# Patient Record
Sex: MALE | Race: WHITE | NOT HISPANIC OR LATINO | URBAN - METROPOLITAN AREA
[De-identification: names, ages, dates, MRNs, and addresses within clinical notes are randomized per-mention and may not be internally consistent; named-entity substitution may affect disease eponyms.]

---

## 2017-02-17 ENCOUNTER — OFFICE VISIT (OUTPATIENT)
Dept: DERMATOLOGY | Facility: CLINIC | Age: 72
End: 2017-02-17

## 2017-02-17 DIAGNOSIS — L82.0 INFLAMED SEBORRHEIC KERATOSIS: ICD-10-CM

## 2017-02-17 DIAGNOSIS — L21.9 SEBORRHEIC DERMATITIS: Primary | ICD-10-CM

## 2017-02-17 DIAGNOSIS — D18.01 CHERRY ANGIOMA: ICD-10-CM

## 2017-02-17 DIAGNOSIS — L57.0 AK (ACTINIC KERATOSIS): ICD-10-CM

## 2017-02-17 DIAGNOSIS — L91.8 ST (SKIN TAG): ICD-10-CM

## 2017-02-17 DIAGNOSIS — L82.1 SK (SEBORRHEIC KERATOSIS): ICD-10-CM

## 2017-02-17 DIAGNOSIS — Z85.828 HISTORY OF NONMELANOMA SKIN CANCER: ICD-10-CM

## 2017-02-17 RX ORDER — SILDENAFIL 25 MG/1
TABLET, FILM COATED ORAL
COMMUNITY
Start: 2009-05-27

## 2017-02-17 RX ORDER — SILDENAFIL 50 MG/1
50 TABLET, FILM COATED ORAL
COMMUNITY
Start: 2012-10-26

## 2017-02-17 RX ORDER — VENLAFAXINE HYDROCHLORIDE 150 MG/1
CAPSULE, EXTENDED RELEASE ORAL DAILY
COMMUNITY

## 2017-02-17 RX ORDER — SIMVASTATIN 20 MG
TABLET ORAL
COMMUNITY
Start: 2009-05-27

## 2017-02-17 RX ORDER — ALBUTEROL SULFATE 90 UG/1
1-2 AEROSOL, METERED RESPIRATORY (INHALATION)
COMMUNITY

## 2017-02-17 RX ORDER — AMPICILLIN TRIHYDRATE 250 MG
500 CAPSULE ORAL
COMMUNITY
Start: 2012-10-26

## 2017-02-17 RX ORDER — KETOCONAZOLE 20 MG/G
CREAM TOPICAL DAILY
Qty: 60 G | Refills: 5 | Status: SHIPPED | OUTPATIENT
Start: 2017-02-17

## 2017-02-17 RX ORDER — LISINOPRIL 40 MG/1
40 TABLET ORAL
COMMUNITY

## 2017-02-17 RX ORDER — NAPROXEN 500 MG/1
TABLET ORAL
COMMUNITY
Start: 2007-01-03

## 2017-02-17 ASSESSMENT — PAIN SCALES - GENERAL: PAINLEVEL: NO PAIN (0)

## 2017-02-17 NOTE — LETTER
"2/17/2017       RE: Mick Luevano  [address] [address] [facility] [address]     Dear Colleague,    Thank you for referring your patient, Mick Luevano, to the [facility] DERMATOLOGY at [facility]. Please see a copy of my visit note below.    [facility] Dermatology Note    Dermatology Problem List:  1. History of BCC, left nasal ala s/p Mohs 10/1/08  2. Inflamed SK s/p cryo  3. Seborrheic dermatitis  - ketoconazole 2% cream  4. AK s/p cryo     CC:   Chief Complaint   Patient presents with     Skin Check     last skin check \"over 3 years ago\"     Date of Service: Feb 17, 2017    History of Present Illness:  Mr. Mick Luevano is a 71 year old male who presents for a skin check. The patient was last seen on 9/12/13 by Dr. Stone when an irritated and inflamed SK was treated with cryotherapy. Today the patient reports a spot of concern on his left temple. He also reports some redness and scaly on the eyebrows, forehead, and side of his nose. He has used Elidel cream, vaseline, hydrocortisone cream, and other topical steroid medications. He reports that the spot on the left side of his trunk get caught on his clothing and are irritating. He notes that he has a \"breathing machine\", but he doesn't use it very often. The patient reports no other lesions of concern at this time.    Otherwise, the patient reports no painful, bleeding, nonhealing, or pruritic lesions, and denies new or changing moles.    Past Medical History:   Patient Active Problem List   Diagnosis     Personal history of other malignant neoplasm of skin     No past medical history on file.  No past surgical history on file.     Social History:  The patient is a retired . He wears hats and sunscreen.     Family History:  Not addressed at this visit.     Medications:  Current Outpatient Prescriptions   Medication Sig Dispense Refill     Omeprazole (PRILOSEC PO) Take  by mouth daily.   "     Allergies:  No Known Allergies     Review of Systems:  - Skin: As above in HPI. No additional skin concerns.    Physical exam:  Vitals: There were no vitals taken for this visit.  GEN: This is a well developed, well-nourished male in no acute distress, in a pleasant mood.      SKIN: Total skin excluding the undergarment areas was performed. The exam included the head/face, neck, both arms, chest, back, abdomen, both legs, digits and/or nails.   - Left temple: 1.8 x 1 cm stuck on tan to brown papule  - Stuck on tan to brown papules scattered on the trunk, extremities  - There is macular erythema of the eyebrows, forehead, and sides of nose with mild flaky white scale.  - There is(are) skin colored pedunculated papules on the left axilla.   - There is a bright red dome shaped papule on the left chest.  - Gritty pink papules on the vertex scalp x 2  - No other lesions of concern on areas examined.     Impression/Plan:  1. History of BCC, left nasal ala s/p Mohs 10/1/08  - No evidence or recurrence.    2. Seborrheic keratosis - left temple, trunk, extremities. The lesion on the trunk catches on clothing and are irritating.   - Benign nature was discussed. No further intervention required at this time.   - Cryotherapy procedure note: After verbal consent and discussion of risks and benefits including but no limited to dyspigmentation/scar, blister, and pain, 1 was(were) treated with 1-2mm freeze border for 2 cycles with liquid nitrogen. Post cryotherapy instructions were provided.     3. Seborrheic dermatitis  - Start ketoconazole 2% cream - apply daily to affected areas  - If this provides no improvement, contact clinic and can restart Elidel cream.    4. Acrochordon - left axilla  - Benign nature was discussed. No further intervention required at this time.     5. Cherry angioma - left chest  - Benign nature was discussed. No further intervention required at this time.     6. Actinic keratoses - vertex scalp x 2  -  Cryotherapy procedure note: After verbal consent and discussion of risks and benefits including but no limited to dyspigmentation/scar, blister, and pain, 2 was(were) treated with 1-2mm freeze border for 2 cycles with liquid nitrogen. Post cryotherapy instructions were provided.     Follow-up in 1 year, earlier for new or changing lesions.    Staff Involved:  Staff Only    Scribe Disclosure:   I, Magdalena Nicole, am serving as a scribe to document services personally performed by Dr. Shmuel Marcum, based on data collection and the provider's statements to me.     Staff attestation:  The documentation recorded by the scribe accurately reflects the services I personally performed and the decisions I personally made.    Shmuel Marcum MD  Staff Dermatologist    Department of Dermatology

## 2017-02-17 NOTE — NURSING NOTE
"Dermatology Rooming Note    Mick Luevano's goals for this visit include:   Chief Complaint   Patient presents with     Skin Check     last skin check \"over 3 years ago\"       Berna Draper LPN    "

## 2017-02-17 NOTE — MR AVS SNAPSHOT
After Visit Summary   2/17/2017    Mick Luevano    MRN: [id number]           Patient Information     Date Of Birth          1945        Visit Information        Provider Department      2/17/2017 1:45 PM Shmuel Marcum MD [facility] Dermatology        Today's Diagnoses     Seborrheic dermatitis    -  1    SK (seborrheic keratosis)        AK (actinic keratosis)        Inflamed seborrheic keratosis          Care Instructions    Cryotherapy    What is it?    Use of a very cold liquid, such as liquid nitrogen, to freeze and destroy abnormal skin cells that need to be removed    What should I expect?    Tenderness and redness    A small blister that might grow and fill with dark purple blood. There may be crusting.    More than one treatment may be needed if the lesions do not go away.    How do I care for the treated area?    Gently wash the area with your hands when bathing.    Use a thin layer of Vaseline to help with healing. You may use a Band-Aid.     The area should heal within 7-10 days and may leave behind a pink or lighter color.     Do not use an antibiotic or Neosporin ointment.     You may take acetaminophen (Tylenol) for pain.     Call your Doctor if you have:    Severe pain    Signs of infection (warmth, redness, cloudy yellow drainage, and or a bad smell)    Questions or concerns    Who should I call with questions?       [facility]: [phone]       [facility]: [phone]       For urgent needs outside of business hours call the [facility] at [phone]        and ask for the dermatology resident on call          Follow-ups after your visit        Who to contact     Please call your clinic at [phone] to:    Ask questions about your health    Make or cancel appointments    Discuss your medicines    Learn about your test results    Speak to your doctor   If you have compliments or concerns about an  experience at your clinic, or if you wish to file a complaint, please contact [facility] Patient Relations at [phone] or email us at [email or web address]         Additional Information About Your Visit        Care EveryWhere ID     This is your Care EveryWhere ID. This could be used by other organizations to access your [place] medical records  [id number]         Blood Pressure from Last 3 Encounters:   No data found for BP    Weight from Last 3 Encounters:   No data found for Wt              We Performed the Following     DESTRUCT BENIGN LESION, UP TO 14     DESTRUCT PREMALIGNANT LESION, 2-14     DESTRUCT PREMALIGNANT LESION, FIRST          Today's Medication Changes          These changes are accurate as of: 2/17/17 11:59 PM.  If you have any questions, ask your nurse or doctor.               Start taking these medicines.        Dose/Directions    ketoconazole 2 % cream   Commonly known as:  NIZORAL   Used for:  Seborrheic dermatitis   Started by:  Shmuel Marcum MD        Apply topically daily   Quantity:  60 g   Refills:  5            Where to get your medicines      These medications were sent to [id number] [place] [facility] [address] [address]  [address] [address]     Phone:  [phone]     ketoconazole 2 % cream                Primary Care Provider Office Phone # Fax #    Daniel Marcus [phone]       [facility] [address] [facility] [address]        Thank you!     Thank you for choosing [facility] DERMATOLOGY  for your care. Our goal is always to provide you with excellent care. Hearing back from our patients is one way we can continue to improve our services. Please take a few minutes to complete the written survey that you may receive in the mail after your visit with us. Thank you!             Your Updated Medication List - Protect others around you: Learn how  to safely use, store and throw away your medicines at www.disposemymeds.org.          This list is accurate as of: 2/17/17 11:59 PM.  Always use your most recent med list.                   Brand Name Dispense Instructions for use    albuterol 108 (90 BASE) MCG/ACT Inhaler    PROAIR HFA/PROVENTIL HFA/VENTOLIN HFA     Inhale 1-2 puffs into the lungs       cinnamon 500 MG Caps      Take 500 mg by mouth       ketoconazole 2 % cream    NIZORAL    60 g    Apply topically daily       lisinopril 40 MG tablet    PRINIVIL/ZESTRIL     Take 40 mg by mouth       metFORMIN 500 MG tablet    GLUCOPHAGE         NAPROSYN 500 MG tablet   Generic drug:  naproxen          PRILOSEC PO      Take  by mouth daily.       * sildenafil 25 MG cap/tab    REVATIO/VIAGRA         * sildenafil 50 MG cap/tab    REVATIO/VIAGRA     Take 50 mg by mouth       simvastatin 20 MG tablet    ZOCOR         venlafaxine 150 MG 24 hr capsule    EFFEXOR-XR     Take by mouth daily       * Notice:  This list has 2 medication(s) that are the same as other medications prescribed for you. Read the directions carefully, and ask your doctor or other care provider to review them with you.

## 2017-02-17 NOTE — PROGRESS NOTES
"[facility] Dermatology Note    Dermatology Problem List:  1. History of BCC, left nasal ala s/p Mohs 10/1/08  2. Inflamed SK s/p cryo  3. Seborrheic dermatitis  - ketoconazole 2% cream  4. AK s/p cryo     CC:   Chief Complaint   Patient presents with     Skin Check     last skin check \"over 3 years ago\"     Date of Service: Feb 17, 2017    History of Present Illness:  Mr. Mick Luevano is a 71 year old male who presents for a skin check. The patient was last seen on 9/12/13 by Dr. Stone when an irritated and inflamed SK was treated with cryotherapy. Today the patient reports a spot of concern on his left temple. He also reports some redness and scaly on the eyebrows, forehead, and side of his nose. He has used Elidel cream, vaseline, hydrocortisone cream, and other topical steroid medications. He reports that the spot on the left side of his trunk get caught on his clothing and are irritating. He notes that he has a \"breathing machine\", but he doesn't use it very often. The patient reports no other lesions of concern at this time.    Otherwise, the patient reports no painful, bleeding, nonhealing, or pruritic lesions, and denies new or changing moles.    Past Medical History:   Patient Active Problem List   Diagnosis     Personal history of other malignant neoplasm of skin     No past medical history on file.  No past surgical history on file.     Social History:  The patient is a retired . He wears hats and sunscreen.     Family History:  Not addressed at this visit.     Medications:  Current Outpatient Prescriptions   Medication Sig Dispense Refill     Omeprazole (PRILOSEC PO) Take  by mouth daily.       Allergies:  No Known Allergies     Review of Systems:  - Skin: As above in HPI. No additional skin concerns.    Physical exam:  Vitals: There were no vitals taken for this visit.  GEN: This is a well developed, well-nourished male in no acute distress, in a pleasant mood.      SKIN: " Total skin excluding the undergarment areas was performed. The exam included the head/face, neck, both arms, chest, back, abdomen, both legs, digits and/or nails.   - Left temple: 1.8 x 1 cm stuck on tan to brown papule  - Stuck on tan to brown papules scattered on the trunk, extremities  - There is macular erythema of the eyebrows, forehead, and sides of nose with mild flaky white scale.  - There is(are) skin colored pedunculated papules on the left axilla.   - There is a bright red dome shaped papule on the left chest.  - Gritty pink papules on the vertex scalp x 2  - No other lesions of concern on areas examined.     Impression/Plan:  1. History of BCC, left nasal ala s/p Mohs 10/1/08  - No evidence or recurrence.    2. Seborrheic keratosis - left temple, trunk, extremities. The lesion on the trunk catches on clothing and are irritating.   - Benign nature was discussed. No further intervention required at this time.   - Cryotherapy procedure note: After verbal consent and discussion of risks and benefits including but no limited to dyspigmentation/scar, blister, and pain, 1 was(were) treated with 1-2mm freeze border for 2 cycles with liquid nitrogen. Post cryotherapy instructions were provided.     3. Seborrheic dermatitis  - Start ketoconazole 2% cream - apply daily to affected areas  - If this provides no improvement, contact clinic and can restart Elidel cream.    4. Acrochordon - left axilla  - Benign nature was discussed. No further intervention required at this time.     5. Cherry angioma - left chest  - Benign nature was discussed. No further intervention required at this time.     6. Actinic keratoses - vertex scalp x 2  - Cryotherapy procedure note: After verbal consent and discussion of risks and benefits including but no limited to dyspigmentation/scar, blister, and pain, 2 was(were) treated with 1-2mm freeze border for 2 cycles with liquid nitrogen. Post cryotherapy instructions were provided.      Follow-up in 1 year, earlier for new or changing lesions.    Staff Involved:  Staff Only    Scribe Disclosure:   I, Magdalena Nicole, am serving as a scribe to document services personally performed by Dr. Shmuel Marcum, based on data collection and the provider's statements to me.     Staff attestation:  The documentation recorded by the scribe accurately reflects the services I personally performed and the decisions I personally made.    Shmuel Marcum MD  Staff Dermatologist    Department of Dermatology

## 2017-02-17 NOTE — PATIENT INSTRUCTIONS
Cryotherapy    What is it?    Use of a very cold liquid, such as liquid nitrogen, to freeze and destroy abnormal skin cells that need to be removed    What should I expect?    Tenderness and redness    A small blister that might grow and fill with dark purple blood. There may be crusting.    More than one treatment may be needed if the lesions do not go away.    How do I care for the treated area?    Gently wash the area with your hands when bathing.    Use a thin layer of Vaseline to help with healing. You may use a Band-Aid.     The area should heal within 7-10 days and may leave behind a pink or lighter color.     Do not use an antibiotic or Neosporin ointment.     You may take acetaminophen (Tylenol) for pain.     Call your Doctor if you have:    Severe pain    Signs of infection (warmth, redness, cloudy yellow drainage, and or a bad smell)    Questions or concerns    Who should I call with questions?       [facility]: [phone]       [facility]: [phone]       For urgent needs outside of business hours call the [facility] at [phone]        and ask for the dermatology resident on call